# Patient Record
Sex: MALE | Race: WHITE | NOT HISPANIC OR LATINO | URBAN - METROPOLITAN AREA
[De-identification: names, ages, dates, MRNs, and addresses within clinical notes are randomized per-mention and may not be internally consistent; named-entity substitution may affect disease eponyms.]

---

## 2019-09-03 ENCOUNTER — COMMUNICATION - HEALTHEAST (OUTPATIENT)
Dept: UROLOGY | Facility: CLINIC | Age: 54
End: 2019-09-03

## 2019-09-06 ENCOUNTER — AMBULATORY - HEALTHEAST (OUTPATIENT)
Dept: LAB | Facility: CLINIC | Age: 54
End: 2019-09-06

## 2019-09-06 ENCOUNTER — OFFICE VISIT - HEALTHEAST (OUTPATIENT)
Dept: UROLOGY | Facility: CLINIC | Age: 54
End: 2019-09-06

## 2019-09-06 DIAGNOSIS — N20.0 CALCULUS OF KIDNEY: ICD-10-CM

## 2019-09-06 DIAGNOSIS — N20.1 CALCULUS OF URETER: ICD-10-CM

## 2019-09-06 LAB
ALBUMIN SERPL-MCNC: 3.8 G/DL (ref 3.5–5)
ANION GAP SERPL CALCULATED.3IONS-SCNC: 9 MMOL/L (ref 5–18)
BUN SERPL-MCNC: 20 MG/DL (ref 8–22)
CALCIUM SERPL-MCNC: 9.1 MG/DL (ref 8.5–10.5)
CALCIUM SERPL-MCNC: 9.2 MG/DL (ref 8.5–10.5)
CALCIUM, IONIZED MEASURED: 1.19 MMOL/L (ref 1.11–1.3)
CHLORIDE BLD-SCNC: 105 MMOL/L (ref 98–107)
CO2 SERPL-SCNC: 26 MMOL/L (ref 22–31)
CREAT SERPL-MCNC: 1.23 MG/DL (ref 0.7–1.3)
CREAT SERPL-MCNC: 1.3 MG/DL (ref 0.7–1.3)
GFR SERPL CREATININE-BSD FRML MDRD: 58 ML/MIN/1.73M2
GFR SERPL CREATININE-BSD FRML MDRD: >60 ML/MIN/1.73M2
GLUCOSE BLD-MCNC: 115 MG/DL (ref 70–125)
ION CA PH 7.4: 1.13 MMOL/L (ref 1.11–1.3)
MAGNESIUM SERPL-MCNC: 2 MG/DL (ref 1.8–2.6)
PH: 7.27 (ref 7.35–7.45)
PHOSPHATE SERPL-MCNC: 3.7 MG/DL (ref 2.5–4.5)
PHOSPHATE SERPL-MCNC: 3.8 MG/DL (ref 2.5–4.5)
POTASSIUM BLD-SCNC: 4.9 MMOL/L (ref 3.5–5)
PTH-INTACT SERPL-MCNC: 104 PG/ML (ref 10–86)
SODIUM SERPL-SCNC: 140 MMOL/L (ref 136–145)
URATE SERPL-MCNC: 5.5 MG/DL (ref 3–8)

## 2019-09-08 LAB
APPEARANCE STONE: NORMAL
COMPN STONE: NORMAL
NUMBER STONE: 1
SIZE STONE: > 9 MM
SPECIMEN WT: 401 MG

## 2021-05-26 VITALS — SYSTOLIC BLOOD PRESSURE: 135 MMHG | HEART RATE: 99 BPM | DIASTOLIC BLOOD PRESSURE: 85 MMHG | TEMPERATURE: 97.8 F

## 2021-05-31 NOTE — TELEPHONE ENCOUNTER
Message left for patient to call clinic to set up an appointment for kidney stone follow-up for today or tomorrow.  Nidhi Edwards RN

## 2021-06-01 NOTE — PROGRESS NOTES
Assessment/Plan:        Diagnoses and all orders for this visit:    Calculus of ureter  -     Urinalysis Macroscopic  -     Renal Function Profile; Future; Expected date: 09/06/2019  -     Magnesium; Future; Expected date: 09/06/2019  -     Uric Acid; Future; Expected date: 09/06/2019  -     Calcium, Ionized, Measured; Future; Expected date: 09/06/2019  -     Parathyroid Hormone Intact with Minerals; Future; Expected date: 09/06/2019  -     Magnesium, 24 Hour Urine; Future  -     Stone Formation, 24 Hour Urine (does not include Magnesium); Standing  -     Patient Stated Goal: Prevent further stones  -     24 Hour Urine Collection Steps Education  -     Stone Analysis; Future  -     Stone Analysis    Calculus of kidney  -     Renal Function Profile; Future; Expected date: 09/06/2019  -     Magnesium; Future; Expected date: 09/06/2019  -     Uric Acid; Future; Expected date: 09/06/2019  -     Calcium, Ionized, Measured; Future; Expected date: 09/06/2019  -     Parathyroid Hormone Intact with Minerals; Future; Expected date: 09/06/2019  -     Magnesium, 24 Hour Urine; Future  -     Stone Formation, 24 Hour Urine (does not include Magnesium); Standing  -     Patient Stated Goal: Prevent further stones  -     24 Hour Urine Collection Steps Education      Stone Management Plan  [facility] 9/6/2019   Urinary Tract Infection No suspicion of infection   Renal Colic Asymptomatic at this time   Renal Failure No suspicion of renal failure   Current CT date 8/30/2019   Right sided stones? No   R Stone Event No current event   Left sided stones? Yes   L Number of ureteral stones 1   L GSD of ureteral stones 12   L Location of ureteral stone Distal   L Number of kidney stones  No renal stones   L GSD of kidney stones 10 - 15   L Hydronephrosis Moderate   L Stone Event New stone passed prior to visit             Subjective:      HPI  Mr. Aurelio Rogel is a 54 y.o.  male presenting to the [facility]  [place] with history of sudden onset of left flank pain on 8/30/2019.  Patient stated it was like a stone in the past 3 years ago.  Pain was severe and he presented to the emergency room UA specific gravity 1.005 pH 8 0-2 RBCs 0-5 WBCs sodium 138 potassium 4.2 calcium 9.2 creatinine 1.21 white blood count 10,800 hemoglobin 15.4.    Personal review of CT scan without contrast obtained 8/30/2019 demonstrated moderate hydroureteronephrosis on the left with a stone at the UVJ measuring 12.9 x 8.2 x 8.8 mm.  No other stones were seen.    Patient presents today completely asymptomatic having passed the stone without difficulty.  Patient is having no flank pain abdominal pain or voiding symptoms of urgency or frequency.  Patient gives history of passing several stones since age 47 the last 3 years ago.  He has had no evaluation for etiology.    Impression spontaneous passage of a 12.9 mm stone shaped like an Chardon  History of passing stones since age 47    Plan recommended stone risk evaluation in view of history of passing stones in the past with disposition to follow.  Patient was in agreement and will proceed with same.  Stone analysis is currently pending.             ROS   Review of Systems  A 12 point comprehensive review of systems is negative except for HPI    Past Medical History:   Diagnosis Date     Depression     not taking meds     Kidney stone        Past Surgical History:   Procedure Laterality Date     APPENDECTOMY         No current outpatient medications on file.     No current facility-administered medications for this visit.        No Known Allergies    Social History     Socioeconomic History     Marital status: Single     Spouse name: Not on file     Number of children: Not on file     Years of education: Not on file     Highest education level: Not on file   Occupational History     Occupation:    Social Needs     Financial resource strain: Not on file     Food insecurity:      Worry: Not on file     Inability: Not on file     Transportation needs:     Medical: Not on file     Non-medical: Not on file   Tobacco Use     Smoking status: Current Every Day Smoker     Smokeless tobacco: Never Used   Substance and Sexual Activity     Alcohol use: Not Currently     Frequency: Never     Drug use: Never     Sexual activity: Not on file   Lifestyle     Physical activity:     Days per week: Not on file     Minutes per session: Not on file     Stress: Not on file   Relationships     Social connections:     Talks on phone: Not on file     Gets together: Not on file     Attends [employer] service: Not on file     Active member of club or organization: Not on file     Attends meetings of clubs or organizations: Not on file     Relationship status: Not on file     Intimate partner violence:     Fear of current or ex partner: Not on file     Emotionally abused: Not on file     Physically abused: Not on file     Forced sexual activity: Not on file   Other Topics Concern     Not on file   Social History Narrative     Not on file       Family History   Problem Relation Age of Onset     Heart disease Mother      Diabetes Sister      Diabetes Brother      Diabetes Sister      Diabetes Brother        Objective:      Physical Exam  Vitals:    09/06/19 1122   BP: 135/85   Pulse: 99   Temp: 97.8  F (36.6  C)     General - well developed, well nourished, appropriate for age. Appears no distress at this time   Heart - regular rate and rhythm, no murmur  Respiratory - normal effort, clear to auscultation, good air entry without adventitious noises  Abdomen - slender soft, non-tender, no hepatosplenomegaly, no masses.   - no flank tenderness, no suprapubic tenderness, kidney and bladder non-palpable  MSK - normal spinal curvature. no spinal tenderness. normal gait. muscular strength intact.  Neurology - cranial nerves II-XII grossly intact, normal sensation, no unsteadiness  Skin - intact, no bruising, no gouty  tophi  Psych - oriented to time, place, and person, normal mood and affect.      Labs  Urinalysis POC (Office):  Nitrite, UA   Date Value Ref Range Status   08/30/2019 Negative Negative Final       Lab Urinalysis:  Blood, UA   Date Value Ref Range Status   08/30/2019 Small (!) Negative Final     Nitrite, UA   Date Value Ref Range Status   08/30/2019 Negative Negative Final     Leukocytes, UA   Date Value Ref Range Status   08/30/2019 Negative Negative Final     pH, UA   Date Value Ref Range Status   08/30/2019 8.0 4.5 - 8.0 Final

## 2021-06-01 NOTE — PATIENT INSTRUCTIONS - HE
Patient Stated Goal: Prevent further stones  Steps for collecting a 24 hour urine specimen    Please follow the directions carefully. All urine voided for a 24-hour period needs to be collected into the jug.  DO NOT change any of your  normal  daily habits when doing this test. Continue to follow your regular diet, intake of fluids, and usual activity level. Pick the most convenient day with your schedule, perhaps on a weekend or a day off.    Start your Diet Log the day before collection and continue on the day of urine collection.  You MUST bring Diet Log with you on follow up visit to discuss results.    One 24hr Urine Collection     Two 24hr Urine Collections  (do not collect on consecutive days)    PLEASE COMPLETE THE 2nd JUG WITHIN 1-2 WEEKS FROM THE 1st JUG    STEP 1  Empty your bladder completely into the toilet. This will be your start time. Write your full legal name, start date and time on the jug label.  Collection start and stop times need to match exactly!  For example:  6 am to 6 am.    STEP 2  The next time you urinate, empty your bladder directly into the jug or collection hat and pour urine into the jug.  Screw the lid back onto the jug.  Do not spill!    STEP 3  Place the jug in the refrigerator or a cooler with ice during the collection period.  Failure to keep it cool could cause inaccurate test results. DO NOT Freeze.    STEP 4  Continue collecting all urine into the jug for the rest of the day, for the full 24 hours.  DO NOT stop early or go over 24 hours!    STEP 5  Exactly 24 hours from start of collection, write your full legal name, stop date and time on the jug label.   Collection start and stop times need to match exactly!  For example:  6 am to 6 am.  Failure to label correctly will result in recollection of urine specimen.    STEP 6  Return each jug within 24 hours after final urination.     STEP 7  Drop off jug locations:   [facility]: Mon-Fri 7am-7pm - Closed on  weekends  [facility]: Mon-Fri 7am-5pm - Closed on Sunday  [facility]: Mon-Fri 7am-6:30pm - Closed on weekends    STEP 8  Please call [facility] after return of your final jug to schedule your follow-up visit. [phone]

## 2021-06-01 NOTE — PROGRESS NOTES
Patient presents to the office today for a stone consultation.  He was recently into the [facility]'s ER and passed his stone which he brought here with him.